# Patient Record
(demographics unavailable — no encounter records)

---

## 2017-01-01 NOTE — CIRCUMCISION NOTE
=================================================================

Circumcision Note

=================================================================

Datetime Report Generated by CPN: 2017 15:57

   

   

=================================================================

PRIOR TO PROCEDURE

=================================================================

   

Consent Signed:  Written Consent Signed and on Chart

Position:  Supine; Papoose Board

Circumcision Time Out:  Correct Patient Identity; Correct Side and Site

   are Marked; Accurate Procedure Consent Form; Agreement on Procedure

   to be Done; Correct Patient Position; Relevant Images and Results

   are Properly Labeled and Displayed; Addressed Need to Administer

   Antibiotics or Fluids for Irrigation; Safety Precautions Based on

   Patient History or Medication Use

   

=================================================================

PROCEDURE INFORMATION

=================================================================

   

Site Prep:  Chlorhexidine; Sterile Drape

Circumcision Performed By::  Sheyla Paris MD

Block/Anesthestics:  1 Percent Lidocaine; Dorsal Nerve Block

Equipment Used:  Mogen Clamp

Faustin Size:  N/A

Systemic Medications:  Sweetease

Complications:  None

Status:  Excellent Cosmetic Outcome; Tolerated Procedure Well;

   Hemostatic

Parents Present:  None

Provider Procedure Note:  Consent Obtained. Prepped and draped in usual

   sterile fashion. Dorsal penile block with 0.8ml of 1% lidocaine.

   Redundant foreskin excised with Mogen. Excellent hemostasis with

   minimal application of silver nitrate. Vaseline gauze dressing

   applied.

   

=================================================================

SIGNATURE

=================================================================

   

Signature:  Electronically signed by Sheyla Paris MD ([email or web address]) on

   2017 at 22:11  with User ID: [email or web address]